# Patient Record
Sex: MALE | Race: ASIAN | NOT HISPANIC OR LATINO | URBAN - METROPOLITAN AREA
[De-identification: names, ages, dates, MRNs, and addresses within clinical notes are randomized per-mention and may not be internally consistent; named-entity substitution may affect disease eponyms.]

---

## 2022-02-16 ENCOUNTER — EMERGENCY (EMERGENCY)
Facility: HOSPITAL | Age: 32
LOS: 0 days | Discharge: HOME | End: 2022-02-16
Attending: EMERGENCY MEDICINE
Payer: MEDICAID

## 2022-02-16 VITALS
TEMPERATURE: 98 F | HEIGHT: 65 IN | WEIGHT: 164.02 LBS | DIASTOLIC BLOOD PRESSURE: 90 MMHG | RESPIRATION RATE: 18 BRPM | HEART RATE: 80 BPM | OXYGEN SATURATION: 98 % | SYSTOLIC BLOOD PRESSURE: 125 MMHG

## 2022-02-16 DIAGNOSIS — M54.50 LOW BACK PAIN, UNSPECIFIED: ICD-10-CM
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
## 2022-02-16 DIAGNOSIS — S39.012A STRAIN OF MUSCLE, FASCIA AND TENDON OF LOWER BACK, INITIAL ENCOUNTER: ICD-10-CM

## 2022-02-16 PROCEDURE — 99284 EMERGENCY DEPT VISIT MOD MDM: CPT

## 2022-02-16 RX ORDER — IBUPROFEN 200 MG
1 TABLET ORAL
Qty: 15 | Refills: 0
Start: 2022-02-16 | End: 2022-02-20

## 2022-02-16 RX ORDER — CYCLOBENZAPRINE HYDROCHLORIDE 10 MG/1
1 TABLET, FILM COATED ORAL
Qty: 15 | Refills: 0
Start: 2022-02-16 | End: 2022-02-20

## 2022-02-16 RX ORDER — IBUPROFEN 200 MG
600 TABLET ORAL ONCE
Refills: 0 | Status: COMPLETED | OUTPATIENT
Start: 2022-02-16 | End: 2022-02-16

## 2022-02-16 RX ADMIN — Medication 600 MILLIGRAM(S): at 14:56

## 2022-02-16 NOTE — ED ADULT TRIAGE NOTE - CHIEF COMPLAINT QUOTE
c/o back pain over past several months worse over past few days denies known injury or recent injury improvement with cream otc

## 2022-02-16 NOTE — ED ADULT NURSE NOTE - OBJECTIVE STATEMENT
AAOx3, resps even and unlabored, skin warm and dry. Pt c/o intermittent lower back pain rated 5/10 x3-4 days, denies injury, pain does not radiate and worsens after work. Denies urinary symptoms.

## 2022-02-16 NOTE — ED ADULT NURSE NOTE - ED CARDIAC HEART SOUNDS
VSS, a/o x4. Pt walking in room w steady gait, walking in hallways overnight. Up in chair and returned back to bed. Surgical site clean/dry/intact w abd binder in place. Pt verbalizing pain controlled @ this time. Tolerating clear liquid diet, verbalizing ready for \"real\" food/endorsing appetite. Voiding freely. IVF infusing. Resting comfortably overnight. Pt calls out appropriately when needing assistance. Call light within reach. All needs met.  Will cont to monitor normal S1, S2 heard

## 2022-02-16 NOTE — ED PROVIDER NOTE - MUSCULOSKELETAL MINIMAL EXAM
+ tender to palp L3 to L5 paralumbar muscles increases with lumbar flexion/normal range of motion/neck supple/motor intact/TENDERNESS

## 2022-02-16 NOTE — ED PROVIDER NOTE - CLINICAL SUMMARY MEDICAL DECISION MAKING FREE TEXT BOX
hx, exam, pt has no hx of trauma, no focal neuro deficits imaging is not indicated now pt is referred to spinal doctor for further management.

## 2022-02-16 NOTE — ED PROVIDER NOTE - OBJECTIVE STATEMENT
32 years old male walked in c/o bilateral lower back pain increases to lean fore ivey intermittently for 5 to 6 month and worsen last couple of days. Pt denies hx of trauma, headache, dizziness, blurred visions light sensitivities, focal/distal weakness or numbness, neck/hips pain, uncontrolled urinations or bowel movements. cough, sob, chest pain, nausea, vomiting, fever, chills, abd pain, dysuria, hematuria, or irregular bowel movements. Pt had 2 doses of Pfizer.

## 2022-02-16 NOTE — ED PROVIDER NOTE - PATIENT PORTAL LINK FT
You can access the FollowMyHealth Patient Portal offered by [facility] by registering at the following website: [email or web address]. By joining [employer]’s FollowMyHealth portal, you will also be able to view your health information using other applications (apps) compatible with our system.
